# Patient Record
Sex: FEMALE | Race: WHITE | NOT HISPANIC OR LATINO | Employment: FULL TIME | URBAN - METROPOLITAN AREA
[De-identification: names, ages, dates, MRNs, and addresses within clinical notes are randomized per-mention and may not be internally consistent; named-entity substitution may affect disease eponyms.]

---

## 2022-03-10 PROBLEM — F32.A ANXIETY AND DEPRESSION: Status: ACTIVE | Noted: 2022-03-10

## 2022-03-10 PROBLEM — R05.3 COVID-19 LONG HAULER MANIFESTING CHRONIC COUGH: Status: ACTIVE | Noted: 2022-03-10

## 2022-03-10 PROBLEM — U09.9 COVID-19 LONG HAULER MANIFESTING CHRONIC COUGH: Status: ACTIVE | Noted: 2022-03-10

## 2022-03-10 PROBLEM — F41.9 ANXIETY AND DEPRESSION: Status: ACTIVE | Noted: 2022-03-10

## 2022-04-22 PROBLEM — F32.1 CURRENT MODERATE EPISODE OF MAJOR DEPRESSIVE DISORDER WITHOUT PRIOR EPISODE: Status: ACTIVE | Noted: 2022-04-22

## 2023-06-18 ENCOUNTER — OFFICE VISIT (OUTPATIENT)
Dept: URGENT CARE | Facility: CLINIC | Age: 43
End: 2023-06-18
Payer: COMMERCIAL

## 2023-06-18 VITALS
SYSTOLIC BLOOD PRESSURE: 114 MMHG | TEMPERATURE: 98 F | OXYGEN SATURATION: 95 % | RESPIRATION RATE: 18 BRPM | BODY MASS INDEX: 24.41 KG/M2 | DIASTOLIC BLOOD PRESSURE: 75 MMHG | HEART RATE: 79 BPM | HEIGHT: 64 IN | WEIGHT: 143 LBS

## 2023-06-18 DIAGNOSIS — R51.9 ACUTE NONINTRACTABLE HEADACHE, UNSPECIFIED HEADACHE TYPE: Primary | ICD-10-CM

## 2023-06-18 PROCEDURE — 99204 PR OFFICE/OUTPT VISIT, NEW, LEVL IV, 45-59 MIN: ICD-10-PCS | Mod: S$GLB,,,

## 2023-06-18 PROCEDURE — 99204 OFFICE O/P NEW MOD 45 MIN: CPT | Mod: S$GLB,,,

## 2023-06-18 RX ORDER — BUTALBITAL, ACETAMINOPHEN AND CAFFEINE 50; 325; 40 MG/1; MG/1; MG/1
1 TABLET ORAL EVERY 4 HOURS PRN
Qty: 12 TABLET | Refills: 0 | Status: SHIPPED | OUTPATIENT
Start: 2023-06-18 | End: 2023-07-18

## 2023-06-18 RX ORDER — ONDANSETRON 4 MG/1
4 TABLET, ORALLY DISINTEGRATING ORAL EVERY 8 HOURS PRN
Qty: 20 TABLET | Refills: 0 | Status: SHIPPED | OUTPATIENT
Start: 2023-06-18

## 2023-06-18 NOTE — PROGRESS NOTES
"Subjective:      Patient ID: Santos Kinney is a 43 y.o. female.    Vitals:  height is 5' 4" (1.626 m) and weight is 64.9 kg (143 lb). Her temperature is 98.4 °F (36.9 °C). Her blood pressure is 114/75 and her pulse is 79. Her respiration is 18 and oxygen saturation is 95%.     Chief Complaint: Migraine    44yo female pt complains of headache x2 days to right forehead, light sensitivity, fatigue, nausea/vomiting. Took aleve with prescription pt of [facility] that was taken for nausea with relief. Pt denies history of headaches and reports that the headache came on suddenly when she was walking her dog. Pt does report photophobia. Pt denies changes in vision, slurred speech, confusion, inability to walk or talk, on numbness.    Migraine   This is a new problem. The current episode started yesterday. The problem occurs constantly. The problem has been gradually worsening. The pain is located in the Temporal region. The pain does not radiate. The quality of the pain is described as throbbing and aching. The pain is at a severity of 5/10. The pain is mild. Associated symptoms include dizziness, nausea and vomiting. Pertinent negatives include no abdominal pain, abnormal behavior, anorexia, back pain, blurred vision, coughing, drainage, ear pain, eye pain, eye redness, eye watering, facial sweating, fever, hearing loss, insomnia, loss of balance, muscle aches, neck pain, numbness, phonophobia, photophobia, rhinorrhea, scalp tenderness, seizures, sinus pressure, sore throat, swollen glands, tingling, tinnitus, visual change, weakness or weight loss. The symptoms are aggravated by bright light, activity, emotional stress and noise. She has tried cold packs for the symptoms. The treatment provided no relief.     Constitution: Negative for fever.   HENT:  Negative for ear pain, tinnitus, hearing loss, sinus pressure and sore throat.    Neck: Negative for neck pain.   Eyes:  Negative for eye pain, eye redness, " photophobia and blurred vision.   Respiratory:  Negative for cough.    Gastrointestinal:  Positive for nausea and vomiting. Negative for abdominal pain.   Musculoskeletal:  Negative for back pain.   Neurological:  Positive for dizziness. Negative for loss of balance, numbness and seizures.   Psychiatric/Behavioral:  The patient does not have insomnia.     Objective:     Physical Exam   Constitutional: She is oriented to person, place, and time. She appears well-developed.  Non-toxic appearance. She does not appear ill. No distress.      Comments:Pt appears to be in significant pain, tearful       HENT:   Head: Normocephalic and atraumatic.   Ears:   Right Ear: Hearing, tympanic membrane, external ear and ear canal normal.   Left Ear: Hearing, tympanic membrane, external ear and ear canal normal.   Nose: Nose normal. No mucosal edema, rhinorrhea or nasal deformity. No epistaxis. Right sinus exhibits no maxillary sinus tenderness and no frontal sinus tenderness. Left sinus exhibits no maxillary sinus tenderness and no frontal sinus tenderness.   Mouth/Throat: Uvula is midline, oropharynx is clear and moist and mucous membranes are normal. No trismus in the jaw. Normal dentition. No uvula swelling. No posterior oropharyngeal erythema.   Eyes: Conjunctivae, EOM and lids are normal. Pupils are equal, round, and reactive to light. No scleral icterus.   Neck: Trachea normal and phonation normal. Neck supple. No neck rigidity present.   Cardiovascular: Normal rate, regular rhythm, normal heart sounds and normal pulses.   Pulmonary/Chest: Effort normal and breath sounds normal. No respiratory distress. She has no wheezes.   Abdominal: Normal appearance and bowel sounds are normal. She exhibits no distension. Soft. There is no abdominal tenderness.   Musculoskeletal: Normal range of motion.         General: No deformity. Normal range of motion.   Neurological: no focal deficit. She is alert and oriented to person, place, and  time. She displays no weakness. No cranial nerve deficit. She exhibits normal muscle tone. She has a normal Finger-Nose-Finger Test. Coordination: Romberg sign negative. Coordination and gait normal.      Comments: Tremors noted with pronator drift     Skin: Skin is warm, dry, intact, not diaphoretic and not pale.   Psychiatric: Her speech is normal and behavior is normal. Judgment and thought content normal.   Nursing note and vitals reviewed.  Vision Screening    Right eye Left eye Both eyes   Without correction 20/20 20/15 20/20   With correction          Assessment:     1. Acute nonintractable headache, unspecified headache type        Plan:       Acute nonintractable headache, unspecified headache type  -     butalbital-acetaminophen-caffeine -40 mg (FIORICET, ESGIC) -40 mg per tablet; Take 1 tablet by mouth every 4 (four) hours as needed for Pain.  Dispense: 12 tablet; Refill: 0  -     ondansetron (ZOFRAN-ODT) 4 MG TbDL; Take 1 tablet (4 mg total) by mouth every 8 (eight) hours as needed (nausea).  Dispense: 20 tablet; Refill: 0      Medical Decision Making:   Urgent Care Management:  Pt in no acute distress. Vitals reassuring. Vision screen WNL.Recommended further evaluation in ER given sudden onset of headache with nausea/vomiting and without history of headaches. Pt reports that she would not like to go to the ER at this time and would like to f/u with her PCP tomorrow. Pt reports that she just came here for a work note. Pt reports that if symptoms worsen she will go to the ER. Discussed treatment at home with Fiorcet and Zofran for nausea/vomiting. Reiterated the importance of ER evaluation and discussed if symptoms worsen should seek evaluation. Pt agrees with plan.  Other:   I have discussed this case with another health care provider.       <> Summary of the Discussion: Discussed plan with Dr. Concetta Merrill, agrees with evaluation in ER       Patient Instructions   PLEASE READ YOUR DISCHARGE  INSTRUCTIONS ENTIRELY AS IT CONTAINS IMPORTANT INFORMATION.    You can take the Fioricet every 4 hours as you need it. Do not take more than 6 tablets in 24 hours. Use this medication only as needed as you have been given a limited quantity. This medication may make you sleepy do not drive after taking it. Do not take extra tylenol with this medication.    Use the zofran as needed for nausea- it dissolves under your tongue.     Please go to the emergency room if you experience chest pain, shortness of breath, funny heart beats, headache, blurred vision, weakness in one arm or leg, slurred speech, numbness, inability to walk or talk, confusion.     Try to avoid common triggers of headaches (stress, menstruation, visual stimuli, weather changes, nitrates, fasting, wine, lack of sleep, smoking, odors, chocolate)       Please return or see your primary care doctor if you develop new or worsening symptoms.       Please arrange follow up with your primary medical clinic as soon as possible. You must understand that you've received an Urgent Care treatment only and that you may be released before all of your medical problems are known or treated. You, the patient, will arrange for follow up as instructed. If your symptoms worsen or fail to improve you should go to the Emergency Room.  WE CANNOT RULE OUT ALL POSSIBLE CAUSES OF YOUR SYMPTOMS IN THE URGENT CARE SETTING PLEASE GO TO THE ER IF YOU FEELS YOUR CONDITION IS WORSENING OR YOU WOULD LIKE EMERGENT EVALUATION.

## 2023-06-18 NOTE — LETTER
June 18, 2023      [facility] [place]  [address]  [place] [address]  Phone: [phone]  Fax: [phone]       Patient: Santos Kinney   YOB: 1980  Date of Visit: 06/18/2023    To Whom It May Concern:    Medina Kinney  was at [facility] on 06/18/2023. The patient may return to work/school on 6/20/23 with no restrictions. If you have any questions or concerns, or if I can be of further assistance, please do not hesitate to contact me.    Sincerely,    Jagruti Barrett, NP

## 2023-06-18 NOTE — PATIENT INSTRUCTIONS
PLEASE READ YOUR DISCHARGE INSTRUCTIONS ENTIRELY AS IT CONTAINS IMPORTANT INFORMATION.    You can take the Fioricet every 4 hours as you need it. Do not take more than 6 tablets in 24 hours. Use this medication only as needed as you have been given a limited quantity. This medication may make you sleepy do not drive after taking it. Do not take extra tylenol with this medication.    Use the zofran as needed for nausea- it dissolves under your tongue.     Please go to the emergency room if you experience chest pain, shortness of breath, funny heart beats, headache, blurred vision, weakness in one arm or leg, slurred speech, numbness, inability to walk or talk, confusion.     Try to avoid common triggers of headaches (stress, menstruation, visual stimuli, weather changes, nitrates, fasting, wine, lack of sleep, smoking, odors, chocolate)       Please return or see your primary care doctor if you develop new or worsening symptoms.       Please arrange follow up with your primary medical clinic as soon as possible. You must understand that you've received an Urgent Care treatment only and that you may be released before all of your medical problems are known or treated. You, the patient, will arrange for follow up as instructed. If your symptoms worsen or fail to improve you should go to the Emergency Room.  WE CANNOT RULE OUT ALL POSSIBLE CAUSES OF YOUR SYMPTOMS IN THE URGENT CARE SETTING PLEASE GO TO THE ER IF YOU FEELS YOUR CONDITION IS WORSENING OR YOU WOULD LIKE EMERGENT EVALUATION.

## 2025-04-25 ENCOUNTER — TELEPHONE (OUTPATIENT)
Dept: INTERNAL MEDICINE | Facility: CLINIC | Age: 45
End: 2025-04-25
Payer: COMMERCIAL

## 2025-04-28 ENCOUNTER — OFFICE VISIT (OUTPATIENT)
Dept: INTERNAL MEDICINE | Facility: CLINIC | Age: 45
End: 2025-04-28
Payer: COMMERCIAL

## 2025-04-28 ENCOUNTER — LAB VISIT (OUTPATIENT)
Dept: LAB | Facility: HOSPITAL | Age: 45
End: 2025-04-28
Attending: STUDENT IN AN ORGANIZED HEALTH CARE EDUCATION/TRAINING PROGRAM
Payer: COMMERCIAL

## 2025-04-28 VITALS
OXYGEN SATURATION: 99 % | HEIGHT: 64 IN | HEART RATE: 63 BPM | SYSTOLIC BLOOD PRESSURE: 110 MMHG | DIASTOLIC BLOOD PRESSURE: 80 MMHG | BODY MASS INDEX: 22.32 KG/M2 | WEIGHT: 130.75 LBS

## 2025-04-28 DIAGNOSIS — Z12.4 ENCOUNTER FOR SCREENING FOR MALIGNANT NEOPLASM OF CERVIX: ICD-10-CM

## 2025-04-28 DIAGNOSIS — Z00.00 ENCOUNTER FOR ANNUAL PHYSICAL EXAM: Primary | ICD-10-CM

## 2025-04-28 DIAGNOSIS — F41.1 GAD (GENERALIZED ANXIETY DISORDER): ICD-10-CM

## 2025-04-28 DIAGNOSIS — K21.9 GASTROESOPHAGEAL REFLUX DISEASE WITHOUT ESOPHAGITIS: ICD-10-CM

## 2025-04-28 DIAGNOSIS — L57.0 ACTINIC KERATOSIS: ICD-10-CM

## 2025-04-28 DIAGNOSIS — Z12.11 ENCOUNTER FOR SCREENING FOR MALIGNANT NEOPLASM OF COLON: ICD-10-CM

## 2025-04-28 DIAGNOSIS — Z12.31 ENCOUNTER FOR SCREENING MAMMOGRAM FOR MALIGNANT NEOPLASM OF BREAST: ICD-10-CM

## 2025-04-28 DIAGNOSIS — Z80.3 FAMILY HISTORY OF BREAST CANCER: ICD-10-CM

## 2025-04-28 DIAGNOSIS — Z00.00 ENCOUNTER FOR ANNUAL PHYSICAL EXAM: ICD-10-CM

## 2025-04-28 DIAGNOSIS — Z80.41 FAMILY HISTORY OF OVARIAN CANCER: ICD-10-CM

## 2025-04-28 DIAGNOSIS — E16.2 HYPOGLYCEMIA: ICD-10-CM

## 2025-04-28 LAB
ABSOLUTE EOSINOPHIL (OHS): 0.21 K/UL
ABSOLUTE MONOCYTE (OHS): 0.52 K/UL (ref 0.3–1)
ABSOLUTE NEUTROPHIL COUNT (OHS): 2.67 K/UL (ref 1.8–7.7)
ALBUMIN SERPL BCP-MCNC: 4.1 G/DL (ref 3.5–5.2)
ALP SERPL-CCNC: 54 UNIT/L (ref 40–150)
ALT SERPL W/O P-5'-P-CCNC: 17 UNIT/L (ref 10–44)
ANION GAP (OHS): 9 MMOL/L (ref 8–16)
AST SERPL-CCNC: 16 UNIT/L (ref 11–45)
BASOPHILS # BLD AUTO: 0.05 K/UL
BASOPHILS NFR BLD AUTO: 0.8 %
BILIRUB SERPL-MCNC: 0.3 MG/DL (ref 0.1–1)
BUN SERPL-MCNC: 15 MG/DL (ref 6–20)
CALCIUM SERPL-MCNC: 9.5 MG/DL (ref 8.7–10.5)
CHLORIDE SERPL-SCNC: 104 MMOL/L (ref 95–110)
CHOLEST SERPL-MCNC: 243 MG/DL (ref 120–199)
CHOLEST/HDLC SERPL: 2.5 {RATIO} (ref 2–5)
CO2 SERPL-SCNC: 28 MMOL/L (ref 23–29)
CREAT SERPL-MCNC: 0.8 MG/DL (ref 0.5–1.4)
EAG (OHS): 100 MG/DL (ref 68–131)
ERYTHROCYTE [DISTWIDTH] IN BLOOD BY AUTOMATED COUNT: 13.1 % (ref 11.5–14.5)
GFR SERPLBLD CREATININE-BSD FMLA CKD-EPI: >60 ML/MIN/1.73/M2
GLUCOSE SERPL-MCNC: 92 MG/DL (ref 70–110)
HBA1C MFR BLD: 5.1 % (ref 4–5.6)
HCT VFR BLD AUTO: 43.3 % (ref 37–48.5)
HDLC SERPL-MCNC: 97 MG/DL (ref 40–75)
HDLC SERPL: 39.9 % (ref 20–50)
HGB BLD-MCNC: 14 GM/DL (ref 12–16)
IMM GRANULOCYTES # BLD AUTO: 0.01 K/UL (ref 0–0.04)
IMM GRANULOCYTES NFR BLD AUTO: 0.2 % (ref 0–0.5)
LDLC SERPL CALC-MCNC: 137 MG/DL (ref 63–159)
LYMPHOCYTES # BLD AUTO: 2.43 K/UL (ref 1–4.8)
MCH RBC QN AUTO: 29.9 PG (ref 27–31)
MCHC RBC AUTO-ENTMCNC: 32.3 G/DL (ref 32–36)
MCV RBC AUTO: 93 FL (ref 82–98)
NONHDLC SERPL-MCNC: 146 MG/DL
NUCLEATED RBC (/100WBC) (OHS): 0 /100 WBC
PLATELET # BLD AUTO: 268 K/UL (ref 150–450)
PMV BLD AUTO: 10.9 FL (ref 9.2–12.9)
POTASSIUM SERPL-SCNC: 4.7 MMOL/L (ref 3.5–5.1)
PROT SERPL-MCNC: 7.3 GM/DL (ref 6–8.4)
RBC # BLD AUTO: 4.68 M/UL (ref 4–5.4)
RELATIVE EOSINOPHIL (OHS): 3.6 %
RELATIVE LYMPHOCYTE (OHS): 41.3 % (ref 18–48)
RELATIVE MONOCYTE (OHS): 8.8 % (ref 4–15)
RELATIVE NEUTROPHIL (OHS): 45.3 % (ref 38–73)
SODIUM SERPL-SCNC: 141 MMOL/L (ref 136–145)
TRIGL SERPL-MCNC: 45 MG/DL (ref 30–150)
TSH SERPL-ACNC: 1.7 UIU/ML (ref 0.4–4)
WBC # BLD AUTO: 5.89 K/UL (ref 3.9–12.7)

## 2025-04-28 PROCEDURE — 99386 PREV VISIT NEW AGE 40-64: CPT | Mod: 25,S$GLB,, | Performed by: STUDENT IN AN ORGANIZED HEALTH CARE EDUCATION/TRAINING PROGRAM

## 2025-04-28 PROCEDURE — 84443 ASSAY THYROID STIM HORMONE: CPT

## 2025-04-28 PROCEDURE — 83036 HEMOGLOBIN GLYCOSYLATED A1C: CPT

## 2025-04-28 PROCEDURE — 36415 COLL VENOUS BLD VENIPUNCTURE: CPT

## 2025-04-28 PROCEDURE — 90471 IMMUNIZATION ADMIN: CPT | Mod: S$GLB,,, | Performed by: STUDENT IN AN ORGANIZED HEALTH CARE EDUCATION/TRAINING PROGRAM

## 2025-04-28 PROCEDURE — 3074F SYST BP LT 130 MM HG: CPT | Mod: CPTII,S$GLB,, | Performed by: STUDENT IN AN ORGANIZED HEALTH CARE EDUCATION/TRAINING PROGRAM

## 2025-04-28 PROCEDURE — 84132 ASSAY OF SERUM POTASSIUM: CPT

## 2025-04-28 PROCEDURE — 1159F MED LIST DOCD IN RCRD: CPT | Mod: CPTII,S$GLB,, | Performed by: STUDENT IN AN ORGANIZED HEALTH CARE EDUCATION/TRAINING PROGRAM

## 2025-04-28 PROCEDURE — 3079F DIAST BP 80-89 MM HG: CPT | Mod: CPTII,S$GLB,, | Performed by: STUDENT IN AN ORGANIZED HEALTH CARE EDUCATION/TRAINING PROGRAM

## 2025-04-28 PROCEDURE — 80061 LIPID PANEL: CPT

## 2025-04-28 PROCEDURE — 85025 COMPLETE CBC W/AUTO DIFF WBC: CPT

## 2025-04-28 PROCEDURE — 99999 PR PBB SHADOW E&M-EST. PATIENT-LVL V: CPT | Mod: PBBFAC,,, | Performed by: STUDENT IN AN ORGANIZED HEALTH CARE EDUCATION/TRAINING PROGRAM

## 2025-04-28 PROCEDURE — 90715 TDAP VACCINE 7 YRS/> IM: CPT | Mod: S$GLB,,, | Performed by: STUDENT IN AN ORGANIZED HEALTH CARE EDUCATION/TRAINING PROGRAM

## 2025-04-28 PROCEDURE — 3008F BODY MASS INDEX DOCD: CPT | Mod: CPTII,S$GLB,, | Performed by: STUDENT IN AN ORGANIZED HEALTH CARE EDUCATION/TRAINING PROGRAM

## 2025-04-28 RX ORDER — HYDROXYZINE HYDROCHLORIDE 25 MG/1
25 TABLET, FILM COATED ORAL 3 TIMES DAILY PRN
Qty: 30 TABLET | Refills: 2 | Status: SHIPPED | OUTPATIENT
Start: 2025-04-28

## 2025-04-28 RX ORDER — VENLAFAXINE HYDROCHLORIDE 37.5 MG/1
37.5 CAPSULE, EXTENDED RELEASE ORAL DAILY
Qty: 30 CAPSULE | Refills: 2 | Status: SHIPPED | OUTPATIENT
Start: 2025-04-28 | End: 2025-07-27

## 2025-04-28 NOTE — ASSESSMENT & PLAN NOTE
Patient reports of abdominal cramps, acid reflux certain foods and alcohol use  She quit alcohol use symptoms have a 2024  Triggers include fried food, spicy food  Trial of over-the-counter Prilosec helped  Pepto-Bismol also helps    -talked about avoiding triggers as possible, okay to try Pepto-Bismol or Prilosec  -monitor for red flags and frequency of using medications:  Melena, weight loss, hematemesis:  Refer to endoscopy if needed versus stool H pylori check  -reassess next visit

## 2025-04-28 NOTE — ASSESSMENT & PLAN NOTE
Family history of ovarian cancer in mother ( at age 53), 2 maternal cousins diagnosed at age 38 and 36  Refer to breast cancer high-risk clinic for future surveillance

## 2025-04-28 NOTE — ASSESSMENT & PLAN NOTE
Chronic, present for 6 months  Pink scaly lesion on forehead  Refer to dermatology for skin check possible actinic keratosis

## 2025-04-28 NOTE — PROGRESS NOTES
Patient ID: Santos Kinney is a 45 y.o. female.  Chief Complaint: Establish Care and Abdominal Pain    Subjective:   History of Present Illness    CHIEF COMPLAINT:  Patient presents today to establish care with a new PCP    MENTAL HEALTH:  She has a history of anxiety and depression with current symptoms including daily anxiety, restlessness, irritability, and persistent worry about job security despite good performance. Previous medication trials included Zoloft 200mg which reached plateau effectiveness, and Lexapro which caused adverse mental effects. She denies current suicidal or homicidal ideation.    GASTROINTESTINAL AND DIETARY CONCERNS:  She experiences severe, debilitating abdominal pain with certain foods, particularly those containing heavy oil or fried ingredients. Her diet is limited primarily to rice, beans, and poached chicken. She reports being able to tolerate goat cheese mac and cheese without issues. A previous 3-week trial of omeprazole provided temporary symptom improvement. Due to food reactions, she has limited her eating to daily to avoid symptoms.    HYPOGLYCEMIA:  She experiences hypoglycemic symptoms including dizziness, sweating, and nausea after consuming certain foods. Symptoms have intensified recently, with even small amounts of whole milk in coffee triggering episodes. She reports symptom improvement with sugar intake during these episodes.    DERMATOLOGIC:  She has a 6-month persistent scab and a raised bump on the back of her hand that becomes pink with sun exposure. She reports significant sun sensitivity with associated itching and burning of affected areas.    FAMILY HISTORY:  Her mother passed away at age 53 in 2014 from cancer. Two maternal aunts had breast cancer at a young age. Two maternal cousins had ovarian cancer in their mid-30s, both currently cancer-free. One maternal aunt has a rare autoimmune blood disease requiring prolonged hospitalization at [facility].    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 She currently uses medical marijuana daily in the evenings, obtained through annual telemedicine prescriptions.      ROS:  General: -fever, -chills, -fatigue, -weight gain, -weight loss  Eyes: -vision changes, -redness, -discharge  ENT: -ear pain, -nasal congestion, -sore throat  Cardiovascular: -chest pain, -palpitations, -lower extremity edema  Respiratory: -cough, -shortness of breath  Gastrointestinal: +abdominal pain, +nausea, -vomiting, -diarrhea, -constipation, -blood in stool  Genitourinary: -dysuria, -hematuria, -frequency  Musculoskeletal: -joint pain, -muscle pain  Skin: -rash, +lesion, +sensitivity to sun exposure  Neurological: -headache, +dizziness, -numbness, -tingling  Psychiatric: +anxiety, -depression, -sleep difficulty, +irritability, +inner restlessness  Female Genitourinary: +menstrual pain or symptoms             Objective:   /80 (BP Location: Left arm, Patient Position: Sitting)   Pulse 63   Ht 5' 4" (1.626 m)   Wt 59.3 kg (130 lb 11.7 oz)   LMP 04/08/2025 (Approximate)   SpO2 99%   BMI 22.44 kg/m²      Physical Exam    General: No acute distress. Well-developed. Well-nourished.  Eyes: EOMI. Sclerae anicteric.  HENT: Normocephalic. Atraumatic. Moist oral mucosa.  Ears: Bilateral TMs clear. Bilateral EACs clear.  Cardiovascular: Regular rate. Regular rhythm. No murmurs. No rubs. No gallops. Normal S1, S2.  Respiratory: Normal respiratory effort. Clear to auscultation bilaterally. No rales. No rhonchi. No wheezing.  Abdomen: Soft. Non-tender. Non-distended.   Musculoskeletal: No  obvious deformity.  Extremities: No lower extremity edema.  Neurological: Alert. No slurred speech. Normal gait.  Psychiatric: Normal mood. Normal affect. Good insight. Good judgment.  Skin: Warm. Dry. No rash.           Assessment:       1. Encounter for annual " physical exam    2. SILVIA (generalized anxiety disorder)    3. Family history of breast cancer    4. Family history of ovarian cancer    5. Actinic keratosis    6. Encounter for screening mammogram for malignant neoplasm of breast    7. Encounter for screening for malignant neoplasm of cervix    8. Encounter for screening for malignant neoplasm of colon    9. Gastroesophageal reflux disease without esophagitis    10. Hypoglycemia          Plan:         1. Encounter for annual physical exam  -     Comprehensive Metabolic Panel; Future; Expected date: 04/28/2025  -     CBC Auto Differential; Future; Expected date: 04/28/2025  -     Lipid Panel; Future; Expected date: 04/28/2025  -     TSH; Future; Expected date: 04/28/2025  -     Hemoglobin A1C; Future; Expected date: 04/28/2025  -     Glucose, Fasting; Future; Expected date: 04/28/2025  -     DIPH,PERTUSS(ACEL),TET VAC(PF)(ADULT)(ADACEL)(TDaP)    2. SILVIA (generalized anxiety disorder)  Assessment & Plan:  SILVIA-7:  21:  Severe anxiety  Patient was previously taking Zoloft 200 mg daily for anxiety as well as depression  She wanted to switch Zoloft to different medication because she thought she had plateaued on it.  She was switched to Lexapro apparently without a taper which caused withdrawal symptoms, and she stopped taking any medication for a while:  Currently off medication for at least 2 years.  She denies taking Cymbalta in the past  She currently denies feeling depressed or loss of interest in daily activities or SI/HI    -trial of Effexor 37.5 mg daily for total 8 weeks:  Short-term and long-term side effects discussed:  Return in 8 weeks for uptitration titration  -trial of hydroxyzine for breakthrough anxiety:  Tried 25 mg p.r.n. at bedtime first, as it can cause sedation.  -can continue medical marijuana:  Currently smoking in the morning and in the evening  -return in 8 weeks    Orders:  -     venlafaxine (EFFEXOR-XR) 37.5 MG 24 hr capsule; Take 1 capsule  (37.5 mg total) by mouth once daily.  Dispense: 30 capsule; Refill: 2  -     hydrOXYzine HCL (ATARAX) 25 MG tablet; Take 1 tablet (25 mg total) by mouth 3 (three) times daily as needed for Anxiety.  Dispense: 30 tablet; Refill: 2    3. Family history of breast cancer  Assessment & Plan:  Family history of breast cancer in 2 maternal aunts at the age of 55 and 47  Referred to breast cancer high-risk clinic    Orders:  -     Ambulatory referral/consult to High Risk Clinic ([facility]); Future; Expected date: 2025  -     Ambulatory referral/consult to Endo Procedure ; Future; Expected date: 2025    4. Family history of ovarian cancer  Assessment & Plan:  Family history of ovarian cancer in mother ( at age 53), 2 maternal cousins diagnosed at age 38 and 36  Refer to breast cancer high-risk clinic for future surveillance    Orders:  -     Ambulatory referral/consult to High Risk Clinic ([facility]); Future; Expected date: 2025  -     Ambulatory referral/consult to Endo Procedure ; Future; Expected date: 2025    5. Actinic keratosis  Assessment & Plan:  Chronic, present for 6 months  Pink scaly lesion on forehead  Refer to dermatology for skin check possible actinic keratosis    Orders:  -     Ambulatory referral/consult to Dermatology; Future; Expected date: 2025    6. Encounter for screening mammogram for malignant neoplasm of breast  -     Mammo Digital Screening Bilat; Future; Expected date: 2025    7. Encounter for screening for malignant neoplasm of cervix  -     Ambulatory referral/consult to Gynecology; Future; Expected date: 2025    8. Encounter for screening for malignant neoplasm of colon  -     Ambulatory referral/consult to Endo Procedure ; Future; Expected date: 2025    9. Gastroesophageal reflux disease without esophagitis  Assessment & Plan:  Patient reports of abdominal cramps, acid reflux certain foods and alcohol use  She quit  alcohol use symptoms have a 2024  Triggers include fried food, spicy food  Trial of over-the-counter Prilosec helped  Pepto-Bismol also helps    -talked about avoiding triggers as possible, okay to try Pepto-Bismol or Prilosec  -monitor for red flags and frequency of using medications:  Melena, weight loss, hematemesis:  Refer to endoscopy if needed versus stool H pylori check  -reassess next visit      10. Hypoglycemia  Assessment & Plan:  Patient reports of episodes of hypoglycemia like feeling lightheaded, palpitations, excess sweating which improves after sugar intake  She however states that certain food triggers these symptoms like whole milk  She also states that she has not been eating 3 meals a day and avoiding most of the food types including fruits due to concern for amount of sugar in it.    She does not have a known diagnosis of diabetes.  She has never checked her blood sugars during these episodes to see if her blood glucose is low      -at this time I have asked her to start eating meals consistently.  She should not worry about the sugar in natural foods, but worry about taking additional sugar like candies chocolates sodas.  -check for diabetes with A1c, and check fasting blood sugar  -if fasting blood sugar comes back low or if she continues to have these episodes despite eating meals regularly, I will give her a glucometer to check blood sugars at home  -if blood sugars remain low on testing:  Refer to endocrinology for further workup            Health Maintenance     Health Maintenance Due   Topic    Cervical Cancer Screening     Mammogram     Colorectal Cancer Screening     Last mammogram 2022:  Negative for malignancy:  Ordered today   Colon cancer screening:  Colonoscopy ordered today   Cervical cancer screening:  Referred to Gynecology today   Tdap today    Follow up   Follow up in about 8 weeks (around 6/23/2025).      This note was generated with the assistance of ambient listening  technology. Verbal consent was obtained by the patient and accompanying visitor(s) for the recording of patient appointment to facilitate this note. I attest to having reviewed and edited the generated note for accuracy, though some syntax or spelling errors may persist. Please contact the author of this note for any clarification.           Jamaica Abdul MD  [address]  [place]  [place] [address]  Ph: [phone]

## 2025-04-28 NOTE — ASSESSMENT & PLAN NOTE
Family history of breast cancer in 2 maternal aunts at the age of 55 and 47  Referred to breast cancer high-risk clinic

## 2025-04-28 NOTE — ASSESSMENT & PLAN NOTE
Patient reports of episodes of hypoglycemia like feeling lightheaded, palpitations, excess sweating which improves after sugar intake  She however states that certain food triggers these symptoms like whole milk  She also states that she has not been eating 3 meals a day and avoiding most of the food types including fruits due to concern for amount of sugar in it.    She does not have a known diagnosis of diabetes.  She has never checked her blood sugars during these episodes to see if her blood glucose is low      -at this time I have asked her to start eating meals consistently.  She should not worry about the sugar in natural foods, but worry about taking additional sugar like candies chocolates sodas.  -check for diabetes with A1c, and check fasting blood sugar  -if fasting blood sugar comes back low or if she continues to have these episodes despite eating meals regularly, I will give her a glucometer to check blood sugars at home  -if blood sugars remain low on testing:  Refer to endocrinology for further workup

## 2025-04-28 NOTE — ASSESSMENT & PLAN NOTE
SILVIA-7:  21:  Severe anxiety  Patient was previously taking Zoloft 200 mg daily for anxiety as well as depression  She wanted to switch Zoloft to different medication because she thought she had plateaued on it.  She was switched to Lexapro apparently without a taper which caused withdrawal symptoms, and she stopped taking any medication for a while:  Currently off medication for at least 2 years.  She denies taking Cymbalta in the past  She currently denies feeling depressed or loss of interest in daily activities or SI/HI    -trial of Effexor 37.5 mg daily for total 8 weeks:  Short-term and long-term side effects discussed:  Return in 8 weeks for uptitration titration  -trial of hydroxyzine for breakthrough anxiety:  Tried 25 mg p.r.n. at bedtime first, as it can cause sedation.  -can continue medical marijuana:  Currently smoking in the morning and in the evening  -return in 8 weeks

## 2025-04-29 ENCOUNTER — RESULTS FOLLOW-UP (OUTPATIENT)
Dept: INTERNAL MEDICINE | Facility: CLINIC | Age: 45
End: 2025-04-29
Payer: COMMERCIAL

## 2025-04-29 ENCOUNTER — TELEPHONE (OUTPATIENT)
Dept: ENDOSCOPY | Facility: HOSPITAL | Age: 45
End: 2025-04-29

## 2025-04-29 ENCOUNTER — CLINICAL SUPPORT (OUTPATIENT)
Dept: ENDOSCOPY | Facility: HOSPITAL | Age: 45
End: 2025-04-29
Attending: STUDENT IN AN ORGANIZED HEALTH CARE EDUCATION/TRAINING PROGRAM
Payer: COMMERCIAL

## 2025-04-29 ENCOUNTER — TELEPHONE (OUTPATIENT)
Dept: OBSTETRICS AND GYNECOLOGY | Facility: CLINIC | Age: 45
End: 2025-04-29
Payer: COMMERCIAL

## 2025-04-29 DIAGNOSIS — Z80.41 FAMILY HISTORY OF OVARIAN CANCER: ICD-10-CM

## 2025-04-29 DIAGNOSIS — Z80.3 FAMILY HISTORY OF BREAST CANCER: ICD-10-CM

## 2025-04-29 DIAGNOSIS — Z12.11 ENCOUNTER FOR SCREENING FOR MALIGNANT NEOPLASM OF COLON: ICD-10-CM

## 2025-04-29 NOTE — TELEPHONE ENCOUNTER
Referral for procedure from PAT appointment      Spoke to patient to schedule procedure(s) Colonoscopy       Physician to perform procedure(s) Dr. NIKO Knowles  Date of Procedure (s) 05/20/25  Arrival Time 10:00 AM  Time of Procedure(s) 11:00 AM   Location of Procedure(s) [facility] [address]nd Floor  Type of Rx Prep sent to patient: Miralax  Instructions provided to patient via [facility]    Patient was informed on the following information and verbalized understanding. Screening questionnaire reviewed with patient and complete. If procedure requires anesthesia, a responsible adult needs to be present to accompany the patient home, patient cannot drive after receiving anesthesia. Appointment details are tentative, especially check-in time. Patient will receive a prep-op call 7 days prior to confirm check-in time for procedure. If applicable the patient should contact their pharmacy to verify Rx for procedure prep is ready for pick-up. Patient was advised to call the scheduling department at [phone] if pharmacy states no Rx is available. Patient was advised to call the endoscopy scheduling department if any questions or concerns arise.      [facility] Scheduling Department

## 2025-04-30 ENCOUNTER — OFFICE VISIT (OUTPATIENT)
Dept: OBSTETRICS AND GYNECOLOGY | Facility: CLINIC | Age: 45
End: 2025-04-30
Payer: COMMERCIAL

## 2025-04-30 VITALS
SYSTOLIC BLOOD PRESSURE: 115 MMHG | TEMPERATURE: 97 F | OXYGEN SATURATION: 100 % | HEIGHT: 64 IN | WEIGHT: 128.06 LBS | HEART RATE: 61 BPM | BODY MASS INDEX: 21.86 KG/M2 | DIASTOLIC BLOOD PRESSURE: 73 MMHG

## 2025-04-30 DIAGNOSIS — Z12.4 ENCOUNTER FOR SCREENING FOR MALIGNANT NEOPLASM OF CERVIX: ICD-10-CM

## 2025-04-30 DIAGNOSIS — N94.6 DYSMENORRHEA: ICD-10-CM

## 2025-04-30 DIAGNOSIS — Z01.419 ROUTINE GYNECOLOGICAL EXAMINATION: Primary | ICD-10-CM

## 2025-04-30 DIAGNOSIS — N93.9 ABNORMAL UTERINE BLEEDING (AUB): ICD-10-CM

## 2025-04-30 DIAGNOSIS — Z12.4 CERVICAL CANCER SCREENING: ICD-10-CM

## 2025-04-30 DIAGNOSIS — N94.10 DYSPAREUNIA IN FEMALE: ICD-10-CM

## 2025-04-30 DIAGNOSIS — R10.31 RIGHT LOWER QUADRANT ABDOMINAL PAIN: ICD-10-CM

## 2025-04-30 PROCEDURE — 3008F BODY MASS INDEX DOCD: CPT | Mod: CPTII,S$GLB,, | Performed by: ADVANCED PRACTICE MIDWIFE

## 2025-04-30 PROCEDURE — 3044F HG A1C LEVEL LT 7.0%: CPT | Mod: CPTII,S$GLB,, | Performed by: ADVANCED PRACTICE MIDWIFE

## 2025-04-30 PROCEDURE — 88142 CYTOPATH C/V THIN LAYER: CPT | Mod: TC | Performed by: ADVANCED PRACTICE MIDWIFE

## 2025-04-30 PROCEDURE — 1159F MED LIST DOCD IN RCRD: CPT | Mod: CPTII,S$GLB,, | Performed by: ADVANCED PRACTICE MIDWIFE

## 2025-04-30 PROCEDURE — 3078F DIAST BP <80 MM HG: CPT | Mod: CPTII,S$GLB,, | Performed by: ADVANCED PRACTICE MIDWIFE

## 2025-04-30 PROCEDURE — 87624 HPV HI-RISK TYP POOLED RSLT: CPT | Performed by: ADVANCED PRACTICE MIDWIFE

## 2025-04-30 PROCEDURE — 99386 PREV VISIT NEW AGE 40-64: CPT | Mod: S$GLB,,, | Performed by: ADVANCED PRACTICE MIDWIFE

## 2025-04-30 PROCEDURE — 99999 PR PBB SHADOW E&M-EST. PATIENT-LVL III: CPT | Mod: PBBFAC,,, | Performed by: ADVANCED PRACTICE MIDWIFE

## 2025-04-30 PROCEDURE — 3074F SYST BP LT 130 MM HG: CPT | Mod: CPTII,S$GLB,, | Performed by: ADVANCED PRACTICE MIDWIFE

## 2025-04-30 NOTE — PROGRESS NOTES
"CC: Well woman exam/painful periods    Santos Kinney is a 45 y.o. female No obstetric history on file. presents for well woman exam, along with concerns of abnormal bleeding.  Patient's last menstrual period was 04/08/2025 (exact date).  Pt reports bleeding has become more irregular with periods lasting longer this year Reports she bled almost daily with last menses from 04/08 - 04/26, and this also occurred in March and February. Describes bleeding as very heavy for the first 3-5d of cycle and then irregular/light over the next 6-14d of her cycle.    Reports menses always irregular but the last ~4mo have been "unbearable" related to the pain and length of bleeding. Uses medical THC and takes four (4) Naproxen which temporarily relieves the pain. Hot packs help as well.  Reports pain is severe when it resumes and debilitates her from doing her regalar home and work life responsibilities. She is interested in surgical options to relieve this, including a hysterectomy vs ablation.    Pt reports not currently using anything for contraception. Reports monogamous long-term relationship with male partner - reports has never been able to conceive. Reports never pursuing infertility work-up.  Reports gradual increased pain with intercourse over the last 12-18mo, reports light bleeding following intercourse occasionally. Reports experiencing increased night-sweats and mood swings this year as well.  Reports increased feeling of abdominal "bloating and fullness" over last few months. Reports has colonoscopy scheduled this month.    Pt reports history of ovarian cyst as a teen that she possibly needed surgery for? Difficulty recalling details regarding which side or the extent of procedure.  Last pap: Uncertain? Reports likely greater than 5yrs ago. Denies history of abnormal paps  Pt declines need for STI screening today.   Pt reports she has a mammogram scheduled this month (ordered per PCP)    Reviewed recent labs from " "25:   TSH: 1.69  Hemaglobin A1c: 5.1  CBC: H/H: 14/43, platelets 268    Past Medical History:   Diagnosis Date    Anxiety disorder, unspecified     Depression      Past Surgical History:   Procedure Laterality Date    DENTAL SURGERY  2021    8 teeth removed @ [facility]    SPINAL FUSION  17 years old     Social History[1]  Family History   Problem Relation Name Age of Onset    Other (Non squamous tissue cell cancer) Mother          Ovary    No Known Problems Brother      No Known Problems Maternal Grandmother          smoker    Lung cancer Maternal Grandfather          smoker    Breast cancer Maternal Aunt  55    Breast cancer Paternal Aunt  47    Ovarian cancer Maternal Cousin  38    Ovarian cancer Maternal Cousin  36     OB History               Para        Term   0            AB        Living             SAB        IAB        Ectopic        Multiple        Live Births                     /73 (Patient Position: Sitting)   Pulse 61   Temp 97 °F (36.1 °C)   Ht 5' 4" (1.626 m)   Wt 58.1 kg (128 lb 1.4 oz)   LMP 2025 (Exact Date)   SpO2 100%   BMI 21.99 kg/m²       ROS:  GENERAL: Denies weight gain or weight loss. Feeling well overall.   SKIN: Denies rash or lesions.   HEAD: Denies head injury or headache.   NODES: Denies enlarged lymph nodes.   CHEST: Denies chest pain or shortness of breath.   CARDIOVASCULAR: Denies palpitations or left sided chest pain.   ABDOMEN: No nausea, vomiting or rectal bleeding.   URINARY: No frequency, dysuria, hematuria, or burning on urination.  REPRODUCTIVE: See HPI.   BREASTS: The patient performs breast self-examination and denies pain, lumps, or nipple discharge.   HEMATOLOGIC: No easy bruisability or excessive bleeding.   MUSCULOSKELETAL: Denies joint pain or swelling.   NEUROLOGIC: Denies syncope or weakness. .    PHYSICAL EXAM:  APPEARANCE: Well nourished, well developed, in no acute distress.  AFFECT: Alert and oriented x 3  SKIN: Warm, dry, & " intact. No acne or hirsutism.  NECK: Neck symmetric without masses or thyromegaly  NODES: No cervical, axillary, epitrochlear, inguinal, or femoral lymph node enlargement  CHEST: Good respiratory effect.  ABDOMEN: Soft.  No tenderness or masses.  No hepatosplenomegaly.  No hernias.  BREASTS: Symmetrical, and dense - breast tissue firm and elastic, areola pattern equal bilaterally, no visible lesions.  No nipple discharge bilaterally.  No palpable masses bilaterally.  PELVIC: Normal external genitalia without lesions.  Normal hair distribution.  Adequate perineal body, normal urethral meatus.  Vagina dry and pale without lesions or discharge.  Cervix pink, without lesions, discharge or tenderness.  No significant cystocele or rectocele.    Bimanual exam limited by pt's ability to tolerate secondary to retroflexed uterus and localized tenderness with exam - especially to right lower abdominal/adnexal quadrant. Fullness to right adnexa, although unable to appreciate specific mass.  Left adnexa without masses or tenderness.    EXTREMITIES: No edema.      ASSESSMENT/PLAN:  Routine gynecological examination  -     Liquid-Based Pap Smear, Screening  -     HPV High Risk Genotypes, PCR    Cervical cancer screening  -     Liquid-Based Pap Smear, Screening  -     HPV High Risk Genotypes, PCR    Encounter for screening for malignant neoplasm of breast, unspecified screening modality    Encounter for screening for malignant neoplasm of cervix  -     Ambulatory referral/consult to Gynecology    Abnormal uterine bleeding (AUB)  -     US Pelvis Complete Non OB; Future; Expected date: 04/30/2025  -     POCT Urine Pregnancy    Dysmenorrhea    Dyspareunia in female    Patient was counseled today on A.C.S. Pap guidelines and recommendations for yearly pelvic exams, mammograms and monthly self breast exams; to see her PCP for other health maintenance.     Follow-up: Pending pelvic ultrasound  Message sent to collaborating OB/GYN  physician for surgical consult    Valeri Case CNM         [1]   Social History  Socioeconomic History    Marital status: Single    Number of children: 0   Occupational History    Occupation: [employer]   Tobacco Use    Smoking status: Former     Types: Cigarettes    Smokeless tobacco: Never    Tobacco comments:     2025: Quit 5 years ago,      Smoked in 20:  [id number]   Substance and Sexual Activity    Alcohol use: Never     Comment: quit in 2024 dec, used to drink at proffesional events before    Drug use: Yes     Types: Marijuana     Comment: medical marijuana, daily 1-2 times    Sexual activity: Yes     Partners: Male     Birth control/protection: None     Comment: For 10 years   Social History Narrative    Moved from [place] in 2021    Does not know about fathers medical history      Social Drivers of Health     Financial Resource Strain: Medium Risk (4/22/2025)    Overall Financial Resource Strain (CARDIA)     Difficulty of Paying Living Expenses: Somewhat hard   Food Insecurity: Food Insecurity Present (4/22/2025)    Hunger Vital Sign     Worried About Running Out of Food in the Last Year: Often true     Ran Out of Food in the Last Year: Often true   Transportation Needs: Unmet Transportation Needs (4/22/2025)    PRAPARE - Transportation     Lack of Transportation (Medical): Yes     Lack of Transportation (Non-Medical): Yes   Physical Activity: Sufficiently Active (4/22/2025)    Exercise Vital Sign     Days of Exercise per Week: 5 days     Minutes of Exercise per Session: 90 min   Stress: Stress Concern Present (4/22/2025)    [employer] [place] [employer] - Occupational Stress Questionnaire     Feeling of Stress : Very much   Housing Stability: High Risk (4/22/2025)    Housing Stability Vital Sign     Unable to Pay for Housing in the Last Year: Yes     Number of Times Moved in the Last Year: 2     Homeless in the Last Year: No

## 2025-05-01 ENCOUNTER — TELEPHONE (OUTPATIENT)
Dept: INTERNAL MEDICINE | Facility: CLINIC | Age: 45
End: 2025-05-01
Payer: COMMERCIAL

## 2025-05-02 ENCOUNTER — PATIENT MESSAGE (OUTPATIENT)
Dept: URGENT CARE | Facility: CLINIC | Age: 45
End: 2025-05-02
Payer: COMMERCIAL

## 2025-05-02 NOTE — TELEPHONE ENCOUNTER
Contact patient regarding this message  I am covering for Dr. Abdul   it appears she establish as a new patient with Dr. Abdul April 30th.  Labs were performed looked okay other than high cholesterol    Regarding this message about the heart rate jumping up to 200 as well as the other symptoms and  Thoughts ?   I do not know  See how she is doing presently.  If it is still an issue test then may need to have a follow-up visit with Dr. Abdul   Otherwise if all is fine can continue to observe and watch

## 2025-05-05 ENCOUNTER — TELEPHONE (OUTPATIENT)
Dept: INTERNAL MEDICINE | Facility: CLINIC | Age: 45
End: 2025-05-05
Payer: COMMERCIAL

## 2025-05-05 NOTE — TELEPHONE ENCOUNTER
Attempted to call pt back about elevated HR of 200 but no response at this time. Left VM to call office when available.

## 2025-05-06 ENCOUNTER — TELEPHONE (OUTPATIENT)
Dept: INTERNAL MEDICINE | Facility: CLINIC | Age: 45
End: 2025-05-06
Payer: COMMERCIAL

## 2025-05-06 ENCOUNTER — TELEPHONE (OUTPATIENT)
Dept: OBSTETRICS AND GYNECOLOGY | Facility: CLINIC | Age: 45
End: 2025-05-06
Payer: COMMERCIAL

## 2025-05-06 ENCOUNTER — RESULTS FOLLOW-UP (OUTPATIENT)
Dept: OBSTETRICS AND GYNECOLOGY | Facility: HOSPITAL | Age: 45
End: 2025-05-06

## 2025-05-06 DIAGNOSIS — R11.0 NAUSEA: Primary | ICD-10-CM

## 2025-05-06 LAB
HPV DNA, HIGH RISK TYPE 16, PCR (OHS): NEGATIVE
HPV DNA, HIGH RISK TYPE 18, PCR (OHS): NEGATIVE
HPV DNA, HIGH RISK TYPE OTHER, PCR (OHS): NEGATIVE
INSULIN SERPL-ACNC: NORMAL U[IU]/ML
LAB AP BETHESDA CATEGORY: NORMAL
LAB AP CLINICAL FINDINGS: NORMAL
LAB AP CONTRACEPTIVES: NORMAL
LAB AP GYN ADDITIONAL FINDINGS: NORMAL
LAB AP LMP DATE: NORMAL
LAB AP OCHS PAP SPECIMEN ADEQUACY: NORMAL
LAB AP OHS PAP INTERPRETATION: NORMAL
LAB AP PAP DISCLAIMER COMMENTS: NORMAL
LAB AP PAP ESTROGEN REPLACEMENT THERAPY: NORMAL
LAB AP PAP PMP: NORMAL
LAB AP PAP PREVIOUS BX: NORMAL
LAB AP PAP PRIOR TREATMENT: NORMAL
LAB AP PERFORMING LOCATION(S): NORMAL

## 2025-05-06 RX ORDER — ONDANSETRON 4 MG/1
4 TABLET, ORALLY DISINTEGRATING ORAL EVERY 6 HOURS PRN
Qty: 40 TABLET | Refills: 0 | Status: SHIPPED | OUTPATIENT
Start: 2025-05-06 | End: 2025-05-16

## 2025-05-06 NOTE — TELEPHONE ENCOUNTER
----- Message from Med Assistant Maki sent at 5/6/2025 11:07 AM CDT -----  Contact: [phone]    ----- Message -----  From: Merrick Heath  Sent: 5/6/2025  10:40 AM CDT  To: Franko Berumen Staff    Patient is returning a phone call.Who left a message for the patient: Anitha Evans LPNDoes patient know what this is regarding:  returning a call Would you like a call back, or a response through your [facility] portal?:   call backComments:

## 2025-05-06 NOTE — TELEPHONE ENCOUNTER
----- Message from Biju Bolanos MD sent at 5/6/2025 10:26 AM CDT -----  Regarding: RE: Referral for GYN  Hi!Yes I would be happy to have a surgery consult visit with her. I do think getting the ultrasound is a good idea based on her symptoms and family history.Faythia - would you mind scheduling this patient for a visit with me?Thank you!Biju  ----- Message -----  From: Valeri Case CNM  Sent: 5/5/2025   1:21 PM CDT  To: Biju Bolanos MD  Subject: Referral for GYN                                 Hey!I saw this patient last week and she desires to further discuss options for hysterectomy vs ablation - are these GYN services anything you're providing currently and if we can get her scheduled for a consult?I am concerned about some of her symptoms and the right adnexal pain/fullness she's experiencing - I ordered a pelvic ultrasound and am awaiting that to be scheduled so we can review if she needs further imaging, etc.Valeri Case CNM

## 2025-05-06 NOTE — TELEPHONE ENCOUNTER
Called and spoke to patient. Complaining of being wide awake sweating and insomnia. Patient believes its from the new medication Effexor. Also took hydroxyzine but it is having the opposite effect. Meds are keeping her wide. Making her drowsy but not able to sleep. Needs something for nausea and headaches as well, more for nausea.Patient was on Xanax at one time (0.25 mg) Would like to go back to this medication.

## 2025-05-06 NOTE — TELEPHONE ENCOUNTER
Returned call.  Patient has been experiencing insomnia since starting venlafaxine and hydroxyzine.  She feels that hydroxyzine is having the opposite effect on her sleeping schedule.  She plans to discontinue this.  She also says she is taking the venlafaxine around 11:00 p.m. before going to bed.  Discussed trying this in the morning instead to see if it helps with her insomnia.  Additionally patient has been experiencing intermittent diaphoresis, tachycardia, nausea, and also had an episode of chest pain a couple days ago.  Currently she has no chest pain or tachycardia.  Her only medications are venlafaxine and hydroxyzine, both low doses, I think serotonin syndrome is very unlikely.  Discuss these are potentially side effects of the venlafaxine.  She would like to stick with the venlafaxine a little bit longer to see if these symptoms resolve. She does feel better currently than she did. Zofran prescribed to take as needed for nausea.  Patient was inquiring about his Xanax which was helpful for her back in 2022.  Discussed as Xanax is a controlled substance and I have never seen patient in person I can not prescribe this for her but she can follow up with primary care doctor if her symptoms persist.  ER precautions were discussed.  Patient will call back with any further concerns.    Isabel Ellsworth MD  [facility]

## 2025-05-16 ENCOUNTER — TELEPHONE (OUTPATIENT)
Dept: RADIOLOGY | Facility: HOSPITAL | Age: 45
End: 2025-05-16
Payer: COMMERCIAL

## 2025-06-27 ENCOUNTER — TELEPHONE (OUTPATIENT)
Dept: INTERNAL MEDICINE | Facility: CLINIC | Age: 45
End: 2025-06-27